# Patient Record
Sex: MALE | Race: WHITE | NOT HISPANIC OR LATINO | Employment: OTHER | URBAN - METROPOLITAN AREA
[De-identification: names, ages, dates, MRNs, and addresses within clinical notes are randomized per-mention and may not be internally consistent; named-entity substitution may affect disease eponyms.]

---

## 2017-03-01 ENCOUNTER — OFFICE VISIT (OUTPATIENT)
Dept: NEUROLOGY | Facility: CLINIC | Age: 52
End: 2017-03-01
Payer: MEDICARE

## 2017-03-01 VITALS — DIASTOLIC BLOOD PRESSURE: 90 MMHG | HEART RATE: 72 BPM | SYSTOLIC BLOOD PRESSURE: 120 MMHG

## 2017-03-01 DIAGNOSIS — G82.50 SPASTIC QUADRIPARESIS: Primary | ICD-10-CM

## 2017-03-01 DIAGNOSIS — G93.9 BRAIN LESION: ICD-10-CM

## 2017-03-01 PROCEDURE — 99212 OFFICE O/P EST SF 10 MIN: CPT | Mod: PBBFAC,PO | Performed by: PSYCHIATRY & NEUROLOGY

## 2017-03-01 PROCEDURE — 99999 PR PBB SHADOW E&M-EST. PATIENT-LVL II: CPT | Mod: PBBFAC,,, | Performed by: PSYCHIATRY & NEUROLOGY

## 2017-03-01 PROCEDURE — 99215 OFFICE O/P EST HI 40 MIN: CPT | Mod: S$PBB,,, | Performed by: PSYCHIATRY & NEUROLOGY

## 2017-03-01 NOTE — PROGRESS NOTES
Follow with MRI.    SUBJECTIVE:       HPI:   No significant interim changes. He is complaining of  Vision loss.    Past Medical History   Diagnosis Date    Anemia     Anxiety     Chronic low back pain 2003    Depression     H/O gastric ulcer 11/15     bleeding    Heart murmur     Hyperlipidemia     Hypertension 2003    Substance abuse 2003     pain medicine, marijuna, cocaine     Past Surgical History   Procedure Laterality Date    Spine surgery       L4-5, L5-S1 fusion in 2010    Hand surgery       right hand     Family History   Problem Relation Age of Onset    Rheum arthritis Mother     Hypertension Mother     Hyperlipidemia Mother     Cancer Father      Social History   Substance Use Topics    Smoking status: Never Smoker    Smokeless tobacco: None    Alcohol use Yes     Allergies   Allergen Reactions    Ambien [Zolpidem]      ambien ER    Codeine Itching       Review of Systems   Constitutional: Positive for malaise/fatigue. Negative for fever and weight loss.   HENT: Negative for ear pain, hearing loss and tinnitus.    Eyes: Negative for blurred vision, double vision, photophobia, pain, discharge and redness.   Respiratory: Negative for cough and shortness of breath.    Cardiovascular: Negative for chest pain, palpitations, claudication and leg swelling.   Gastrointestinal: Negative for abdominal pain, heartburn, nausea and vomiting.   Genitourinary: Negative for dysuria, flank pain, frequency and urgency.   Musculoskeletal: Positive for back pain, falls, joint pain, myalgias and neck pain.   Skin: Negative for itching and rash.   Neurological: Positive for sensory change, focal weakness, seizures and weakness. Negative for dizziness, tingling, tremors, speech change, loss of consciousness and headaches.   Endo/Heme/Allergies: Does not bruise/bleed easily.   Psychiatric/Behavioral: Positive for memory loss and substance abuse. Negative for depression, hallucinations and suicidal ideas. The  patient has insomnia. The patient is not nervous/anxious.          OBJECTIVE:         Physical Exam:  Neurological examination is limited.  He is on transportation table .  He can   speak.  His whole body is spastic, lower extremities more than the upper   extremity.  Spasticity 3+ in the lower extremity, 2+ in the upper extremity.  He   can raise his leg off the bed, but he has action tremor and also spasm in both   legs and hands on activity and actions.  Both of his feet are deformed and bent  to the medial.  He can bend his left ankle easily, but right one he cannot.    Strength overall 4-/5 in the lower extremity, but proximally in hip flexion   4-/5, but extreme spasm is noticeable.  Bilateral clonus present in the ankle.    Similarly, hand movement is better, but still there is no fine movements of   fingers, coordinated, activities impeded.  Shoulder movement elicits pain at the   shoulder.  Sensory examination is symmetrical.  There is no facial asymmetry.    Pupils are reactive to light, symmetrical.          Diagnostic Results:  MRI of the brain:7/3/2016      Impression          Subtle increased signal within the occipital pole cortex bilaterally with associated restricted diffusion.  Questionable increased signal within the basal ganglia without restricted diffusion.  Possible posterior reversible encephalopathy syndrome or hypertensive encephalopathy syndrome.  Differential considerations include toxic encephalopathy or metabolic encephalopathy.    Otherwise negative.  No hemorrhage.     MRI of the brain: 9/9/2016    Impression       Abnormal signal changes within the brain bilaterally with involvement of the periventricular white matter, basal ganglia, and occipital cortices.  Similar distribution to prior study with mild increase in intensity suggesting chronic sequelae or evolving changes of nonspecific encephalopathy.  Considerations include hypertensive encephalopathy, toxic or metabolic  encephalopathy, and uremic encephalopathy.           CT head 7/25/2016    Impression               Progressive low attenuation demonstrated symmetrically in the bilateral basal ganglia as above with subtle hypoattenuation in the occipital lobes.  The findings have progressed since the brain MRI on July 30, 2016.  Differential considerations include carbon monoxide/cyanide poisoning or nonspecific metabolic disorder.                 ASSESSMENT/PLAN:     Primary Diagnoses:  His brain lesion from encephalopathy  is expanding as seen in the recent MRI. It maybe non-reversible.    Encounter Diagnoses   Name Primary?    Spastic quadriparesis Yes    Brain lesion     Acute pain of both shoulders        Patient Active Problem List   Diagnosis    Polyarthritis    Degenerative arthritis of lumbar spine    Chronic low back pain    Hyperlipidemia    Essential hypertension    Depression    Drug abuse    Gastrointestinal hemorrhage    Hematemesis without nausea    Acute posthemorrhagic anemia    Alcohol abuse    H/O gastric ulcer    Anxiety    Dental infection    Cardiopulmonary arrest with successful resusciation    Acute kidney injury    Non-traumatic rhabdomyolysis    Dysphagia    Encephalopathy acute    Anemia    Enterococcus UTI    Trauma    Scalp laceration    Gross hematuria    Alcoholism        Plan: No follow up required . Prognosis has been discussed with him.    Time spent: 40 minutes in face to face discussion concerning diagnosis, prognosis, review of lab and test results, benefits of treatment as well as management of disease, counseling of patient and coordination of care between various health care providers . Greater than half the time spent was used for coordination of care and counseling of patient.

## 2023-05-25 PROBLEM — G47.00 INSOMNIA: Status: ACTIVE | Noted: 2023-05-25

## 2023-05-25 PROBLEM — Z78.9 NURSING HOME RESIDENT: Status: ACTIVE | Noted: 2023-05-25

## 2023-05-26 PROBLEM — E43 SEVERE PROTEIN-CALORIE MALNUTRITION: Status: ACTIVE | Noted: 2023-05-26

## 2023-05-26 PROBLEM — N18.30 STAGE 3 CHRONIC KIDNEY DISEASE, UNSPECIFIED WHETHER STAGE 3A OR 3B CKD: Status: ACTIVE | Noted: 2023-05-26

## 2024-08-27 PROBLEM — I69.365: Status: ACTIVE | Noted: 2024-08-27

## 2024-11-01 PROBLEM — E55.9 VITAMIN D DEFICIENCY: Status: ACTIVE | Noted: 2024-11-01

## 2025-01-19 PROBLEM — G40.909 EPILEPSY: Status: ACTIVE | Noted: 2025-01-19

## 2025-02-10 ENCOUNTER — HOSPITAL ENCOUNTER (EMERGENCY)
Facility: HOSPITAL | Age: 60
Discharge: HOME | End: 2025-02-11
Attending: EMERGENCY MEDICINE
Payer: MEDICARE

## 2025-02-10 DIAGNOSIS — T85.528A DISLODGED GASTROSTOMY TUBE: Primary | ICD-10-CM

## 2025-02-10 DIAGNOSIS — K94.23 PEG TUBE MALFUNCTION: ICD-10-CM

## 2025-02-10 PROCEDURE — 99284 EMERGENCY DEPT VISIT MOD MDM: CPT | Mod: 25

## 2025-02-11 VITALS
OXYGEN SATURATION: 98 % | HEIGHT: 67 IN | WEIGHT: 131 LBS | TEMPERATURE: 98 F | BODY MASS INDEX: 20.56 KG/M2 | DIASTOLIC BLOOD PRESSURE: 80 MMHG | HEART RATE: 74 BPM | RESPIRATION RATE: 16 BRPM | SYSTOLIC BLOOD PRESSURE: 137 MMHG

## 2025-02-11 PROCEDURE — 43762 RPLC GTUBE NO REVJ TRC: CPT

## 2025-02-11 NOTE — ED NOTES
Attempted to contact nurse from [facility]. Unable to receive ETA due to nurse hanging up the line. Will reattempt to get an ETA.

## 2025-02-11 NOTE — ED NOTES
Spoke with nurse taking care of patient at nursing home. Reports she believed transportation was set up and will call [facility] at this time for an ETA.

## 2025-02-11 NOTE — ED NOTES
[facility] contacted and spoke with patient's nurse. Notified that PEG tube has been replaced, patient is now discharged, and ready for transportation.   Chronic risk factors; chronic pain and limited insight  Acute risk factors; residual sxs of delirium that can take weeks to clear  Protective factors; no SI/HI or such history, currently does not meet criteria for MDD, no manic/psychotic sxs, good family support, future oriented, no substance use or legal issues.   Overall low risk for suicide/ homicide to be done once confusion clears

## 2025-02-11 NOTE — ED PROVIDER NOTES
EMERGENCY DEPARTMENT HISTORY AND PHYSICAL EXAM     This note is dictated on M*Modal word recognition program.  There are word recognition mistakes and grammatical errors that are occasionally missed on review.     Date: 2/10/2025   Patient Name: Chapo Verde III       History of Presenting Illness      Chief Complaint   Patient presents with    peg tube      Peg tube pulled out, unknown when it occurred.            Chapo Verde III is a 59 y.o. male with PMHX of stroke, dysphagia, debility, G-tube dependent who presents to the emergency department C/O dislodged g tube.    Patient presents with dislodged G-tube.  Nursing home had reported hip been out for about 15 minutes however EMS suspect it was out for much longer.  Patient is at baseline.      PCP: Antoinette Coawrt MD        No current facility-administered medications for this encounter.     Current Outpatient Medications   Medication Sig Dispense Refill    acetaminophen 325 mg Cap 2 tablets by PEG Tube route 2 (two) times daily as needed. (Patient taking differently: 2 tablets by PEG Tube route every 4 (four) hours as needed.)      atorvastatin (LIPITOR) 10 MG tablet Take 10 mg by mouth once daily. (Patient taking differently: 10 mg by Per G Tube route once daily.)      chlorhexidine (PERIDEX) 0.12 % solution Use as directed 15 mLs in the mouth or throat 2 (two) times daily.      EScitalopram oxalate (LEXAPRO) 20 MG tablet Take 20 mg by mouth once daily. (Patient taking differently: 20 mg by Per G Tube route once daily.)      folic acid (FOLVITE) 1 MG tablet Take 1 mg by mouth once daily. (Patient taking differently: 1 mg by Per G Tube route once daily.)      guaiFENesin 100 mg/5 ml (ROBITUSSIN) 100 mg/5 mL syrup Take 5 mLs by mouth every 6 (six) hours as needed for Cough. via PEG tube (Patient taking differently: 5 mLs by Per G Tube route every 6 (six) hours as needed for Cough. via PEG tube)      LIDOcaine (LIDODERM) 5 % Place 1 patch onto the skin  "every 24 hours. Remove & Discard patch within 12 hours or as directed by MD      multivitamin capsule 1 capsule by PEG Tube route once daily.      mupirocin (BACTROBAN) 2 % ointment Apply 1 g topically once daily.      polyethylene glycol (GLYCOLAX) 17 gram/dose powder Take 17 g by mouth once daily.      sodium phosphates (ENEMA) 19-7 gram/118 mL Enem Place 1 enema rectally daily as needed.      valproate (DEPAKENE) 250 mg/5 mL syrup Take 250 mg by mouth once daily. (Patient taking differently: 5 mLs by Per G Tube route 2 (two) times daily.)             Past History     Past Medical History:   Past Medical History:   Diagnosis Date    Anemia     Anxiety     Chronic low back pain 2003    Depression     H/O gastric ulcer 11/15    bleeding    Heart murmur     Hyperlipidemia     Hypertension 2003    Substance abuse 2003    pain medicine, marijuna, cocaine        Past Surgical History:   Past Surgical History:   Procedure Laterality Date    HAND SURGERY      right hand    SPINE SURGERY      L4-5, L5-S1 fusion in 2010        Family History:   Family History   Problem Relation Name Age of Onset    Rheum arthritis Mother      Hypertension Mother      Hyperlipidemia Mother      Cancer Father          Social History:   Social History     Tobacco Use    Smoking status: Never    Smokeless tobacco: Never   Substance Use Topics    Alcohol use: Yes    Drug use: Yes     Types: Cocaine, "Crack" cocaine, Marijuana        Allergies:   Review of patient's allergies indicates:   Allergen Reactions    Ambien [zolpidem]      ambien ER    Codeine Itching          Review of Systems   Review of Systems   See HPI for pertinent positives and negatives       Physical Exam     Vitals:    02/10/25 2343   BP: 136/85   BP Location: Left arm   Pulse: 81   Resp: 16   Temp: 97.6 °F (36.4 °C)   TempSrc: Oral   SpO2: 96%   Weight: 59.4 kg (131 lb)   Height: 5' 7" (1.702 m)      Physical Exam  Vitals and nursing note reviewed.   Constitutional:       " General: He is not in acute distress.     Appearance: He is underweight. He is ill-appearing.      Comments: Chronically ill-appearing frail male lying in bed, speaks but very difficult to understand   HENT:      Head: Normocephalic and atraumatic.   Eyes:      Extraocular Movements: Extraocular movements intact.      Conjunctiva/sclera: Conjunctivae normal.   Pulmonary:      Effort: Pulmonary effort is normal. No respiratory distress.   Abdominal:      Palpations: Abdomen is soft.      Comments: Hypergranulation tissue around ostomy   Musculoskeletal:         General: No deformity or signs of injury. Normal range of motion.      Cervical back: Normal range of motion. No rigidity.   Skin:     General: Skin is dry.      Coloration: Skin is not pale.      Findings: No rash.   Neurological:      Mental Status: He is alert. Mental status is at baseline.      Motor: Weakness and abnormal muscle tone present.              Diagnostic Study Results      Labs -   No results found for this or any previous visit (from the past 12 hours).     Radiologic Studies -    X-Ray Abdomen AP 1 View (KUB)    (Results Pending)        Medications given in the ED-   Medications - No data to display        Medical Decision Making    I am the first provider for this patient.     I reviewed the vital signs, available nursing notes, past medical history, past surgical history, family history and social history.     Vital Signs:  Reviewed the patient's vital signs.     Pulse Oximetry Analysis and Interpretation:    96% on Room Air, normal        External Test Results (Pertinent to encounter):    Records Reviewed: Nursing Notes and Current Prescription Medications    History Obtained By: Patient and EMS    Provider Notes: Chapo Verde III is a 59 y.o. male with dislodged gtube    Co-morbidities Considered:     Differential Diagnosis:       ED Course:    I was unable to find out which size PEG tube patient typically uses and nursing home was  unsure.  A 16 French was placed due to concern that patient has PEG tube had been out longer than 15 minutes.         Problems Addressed:  Gtube replacement.    Procedures:   Feeding Tube    Date/Time: 2/11/2025 12:17 AM  Location procedure was performed: [facility] EMERGENCY DEPARTMENT    Performed by: Declan Billy MD  Authorized by: Declan Billy MD  Consent: The procedure was performed in an emergent situation.  Patient identity confirmed: provided demographic data and arm band  Indications: tube dislodged  Tube type: gastrostomy  Patient position: supine  Procedure type: replacement  Tube size: 16 Fr  Bulb inflation fluid: sterile water  Placement/position confirmation: x-ray and contrast  Tube placement difficulty: minimal             Diagnosis and Disposition          CLINICAL IMPRESSION:         1. Dislodged gastrostomy tube    2. PEG tube malfunction              PLAN:   1. Discharge Home  2.      Medication List        ASK your doctor about these medications      acetaminophen 325 mg Cap     atorvastatin 10 MG tablet  Commonly known as: LIPITOR     chlorhexidine 0.12 % solution  Commonly known as: PERIDEX     ENEMA 19-7 gram/118 mL enema  Generic drug: sodium phosphates     EScitalopram oxalate 20 MG tablet  Commonly known as: LEXAPRO     folic acid 1 MG tablet  Commonly known as: FOLVITE     guaiFENesin 100 mg/5 ml 100 mg/5 mL syrup  Commonly known as: ROBITUSSIN     LIDOcaine 5 %  Commonly known as: LIDODERM     multivitamin capsule     mupirocin 2 % ointment  Commonly known as: BACTROBAN     polyethylene glycol 17 gram/dose powder  Commonly known as: GLYCOLAX     valproate 250 mg/5 mL syrup  Commonly known as: DEPAKENE             3. Antoinette Cowart MD  [address] [place] [address] Dr Miguel BOBO [id number] [phone]    Schedule an appointment as soon as possible for a visit   As needed       _______________________________     Please note that this dictation was completed with M*Modal, the computer  voice recognition software.  Quite often unanticipated grammatical, syntax, homophones, and other interpretive errors are inadvertently transcribed by the computer software.  Please disregard these errors.  Please excuse any errors that have escaped final proofreading.             Declan Billy MD  02/11/25 0017

## 2025-03-16 PROBLEM — H92.09 EAR PAIN: Status: ACTIVE | Noted: 2025-03-16

## 2025-06-29 ENCOUNTER — HOSPITAL ENCOUNTER (EMERGENCY)
Facility: HOSPITAL | Age: 60
Discharge: HOME | End: 2025-06-29
Attending: EMERGENCY MEDICINE
Payer: MEDICARE

## 2025-06-29 VITALS
WEIGHT: 130 LBS | BODY MASS INDEX: 20.4 KG/M2 | OXYGEN SATURATION: 96 % | TEMPERATURE: 98 F | RESPIRATION RATE: 16 BRPM | HEIGHT: 67 IN | DIASTOLIC BLOOD PRESSURE: 71 MMHG | SYSTOLIC BLOOD PRESSURE: 136 MMHG | HEART RATE: 93 BPM

## 2025-06-29 DIAGNOSIS — K94.23 MALFUNCTION OF GASTROSTOMY TUBE: ICD-10-CM

## 2025-06-29 DIAGNOSIS — T85.528A DISLODGED GASTROSTOMY TUBE: Primary | ICD-10-CM

## 2025-06-29 PROCEDURE — 43762 RPLC GTUBE NO REVJ TRC: CPT

## 2025-06-29 PROCEDURE — 99283 EMERGENCY DEPT VISIT LOW MDM: CPT | Mod: 25

## 2025-06-29 RX ORDER — DIATRIZOATE MEGLUMINE AND DIATRIZOATE SODIUM 660; 100 MG/ML; MG/ML
120 SOLUTION ORAL; RECTAL
Status: DISCONTINUED | OUTPATIENT
Start: 2025-06-29 | End: 2025-06-29 | Stop reason: HOSPADM

## 2025-06-29 NOTE — ED NOTES
Contacted [facility], spoke with Aparna Machado RN, she will set up [facility] for pt transport back to the Nursing Home.

## 2025-06-29 NOTE — ED PROVIDER NOTES
Encounter Date: 6/29/2025       History     Chief Complaint   Patient presents with    Gastrostomy     Pt to ED via EMS from [facility] - stated that PEG tube was found out by staff during med pass - dislodged sometime overnight.      59-year-old male with this large gastrostomy tube, occurred sometime during the night, nursing staff put a 16 Croatian Bruce in place a keeps stoma open.  No other issues      Review of patient's allergies indicates:   Allergen Reactions    Ambien [zolpidem]      ambien ER    Codeine Itching    Diclofenac      Past Medical History:   Diagnosis Date    Anemia     Anxiety     Chronic low back pain 2003    Depression     H/O gastric ulcer 11/15    bleeding    Heart murmur     Hyperlipidemia     Hypertension 2003    Substance abuse 2003    pain medicine, marijuna, cocaine     Past Surgical History:   Procedure Laterality Date    HAND SURGERY      right hand    SPINE SURGERY      L4-5, L5-S1 fusion in 2010     Family History   Problem Relation Name Age of Onset    Rheum arthritis Mother      Hypertension Mother      Hyperlipidemia Mother      Cancer Father       Social History[1]  Review of Systems   Constitutional:  Negative for fever.   Respiratory:  Positive for cough. Negative for shortness of breath.    Cardiovascular:  Negative for chest pain.   Gastrointestinal:  Negative for abdominal pain.   Musculoskeletal:  Positive for arthralgias.   All other systems reviewed and are negative.      Physical Exam     Initial Vitals [06/29/25 1121]   BP Pulse Resp Temp SpO2   135/78 80 20 98.2 °F (36.8 °C) 97 %      MAP       --         Physical Exam    Constitutional: He is not diaphoretic. No distress.   HENT:   Head: Normocephalic and atraumatic.   Eyes: Conjunctivae and EOM are normal. Pupils are equal, round, and reactive to light.   Neck: Neck supple.   Normal range of motion.  Cardiovascular:  Normal rate, regular rhythm and normal heart sounds.           Pulmonary/Chest: No respiratory  "distress. He has rhonchi.   Abdominal: Abdomen is soft. Bowel sounds are normal.   Stoma is pink and patent, no signs of infection   Musculoskeletal:         General: No edema.      Cervical back: Normal range of motion and neck supple.     Neurological: He is alert.   Skin: Skin is warm. Capillary refill takes less than 2 seconds.         ED Course   Feeding Tube    Date/Time: 6/29/2025 11:29 AM  Location procedure was performed: [facility] EMERGENCY DEPARTMENT    Performed by: Jesse Aaron MD  Authorized by: Jesse Aaron MD  Consent: Verbal consent not obtained  Risks and benefits: risks, benefits and alternatives were discussed  Consent given by: patient  Patient identity confirmed: arm band and hospital-assigned identification number  Time out: Immediately prior to procedure a "time out" was called to verify the correct patient, procedure, equipment, support staff and site/side marked as required.  Indications: tube dislodged    Sedation:  Patient sedated: no    Local anesthesia used: no    Anesthesia:  Local anesthesia used: no  Tube type: gastrostomy  Patient position: supine  Tube size: 20 Fr.  Bulb inflation volume: 20 (ml)  Bulb inflation fluid: sterile water  Placement/position confirmation: gastric contents aspirated, contrast and x-ray  Tube placement difficulty: none  Complications: No        Labs Reviewed - No data to display       Imaging Results    None          Medications - No data to display  Medical Decision Making  Amount and/or Complexity of Data Reviewed  Radiology: ordered.               ED Course as of 06/29/25 1152   Sun Jun 29, 2025   1151 KUB after injected Gastrografin reveals good G-tube placement [SD]      ED Course User Index  [SD] Jesse Aaron MD               Medical Decision Making:   Differential Diagnosis:   Dislodged gastrostomy tube             Clinical Impression:  Final diagnoses:  [K94.23] Malfunction of gastrostomy tube  [T85.528A] Dislodged gastrostomy tube " "(Primary)          ED Disposition Condition    Discharge Stable          ED Prescriptions    None       Follow-up Information       Follow up With Specialties Details Why Contact Info Additional Information    [facility] Emergency Department Emergency Medicine  As needed [address] [facility] [id number] [phone] Floor [address]                   [1]   Social History  Tobacco Use    Smoking status: Never     Passive exposure: Never    Smokeless tobacco: Never   Substance Use Topics    Alcohol use: Yes    Drug use: Yes     Types: Cocaine, "Crack" cocaine, Marijuana        Jesse Aaron MD  06/29/25 1152    "

## 2025-07-28 ENCOUNTER — HOSPITAL ENCOUNTER (EMERGENCY)
Facility: HOSPITAL | Age: 60
Discharge: HOME | End: 2025-07-28
Attending: EMERGENCY MEDICINE
Payer: MEDICARE

## 2025-07-28 VITALS
OXYGEN SATURATION: 99 % | DIASTOLIC BLOOD PRESSURE: 90 MMHG | HEART RATE: 94 BPM | RESPIRATION RATE: 20 BRPM | SYSTOLIC BLOOD PRESSURE: 121 MMHG | BODY MASS INDEX: 20.4 KG/M2 | HEIGHT: 67 IN | WEIGHT: 130 LBS

## 2025-07-28 DIAGNOSIS — K94.23 MALFUNCTION OF GASTROSTOMY TUBE: Primary | ICD-10-CM

## 2025-07-28 PROCEDURE — 25500020 PHARM REV CODE 255: Performed by: EMERGENCY MEDICINE

## 2025-07-28 PROCEDURE — 99283 EMERGENCY DEPT VISIT LOW MDM: CPT | Mod: 25

## 2025-07-28 PROCEDURE — 43762 RPLC GTUBE NO REVJ TRC: CPT

## 2025-07-28 RX ORDER — DIATRIZOATE MEGLUMINE AND DIATRIZOATE SODIUM 660; 100 MG/ML; MG/ML
100 SOLUTION ORAL; RECTAL
Status: COMPLETED | OUTPATIENT
Start: 2025-07-28 | End: 2025-07-28

## 2025-07-28 RX ADMIN — DIATRIZOATE MEGLUMINE AND DIATRIZOATE SODIUM 100 ML: 660; 100 LIQUID ORAL; RECTAL at 06:07
